# Patient Record
Sex: MALE | Race: WHITE | NOT HISPANIC OR LATINO | Employment: FULL TIME | URBAN - METROPOLITAN AREA
[De-identification: names, ages, dates, MRNs, and addresses within clinical notes are randomized per-mention and may not be internally consistent; named-entity substitution may affect disease eponyms.]

---

## 2021-09-30 ENCOUNTER — THERAPY VISIT (OUTPATIENT)
Dept: PHYSICAL THERAPY | Facility: CLINIC | Age: 61
End: 2021-09-30
Payer: COMMERCIAL

## 2021-09-30 DIAGNOSIS — G89.29 CHRONIC PAIN OF LEFT KNEE: ICD-10-CM

## 2021-09-30 DIAGNOSIS — M25.562 CHRONIC PAIN OF LEFT KNEE: ICD-10-CM

## 2021-09-30 PROCEDURE — 97161 PT EVAL LOW COMPLEX 20 MIN: CPT | Mod: GP | Performed by: PHYSICAL THERAPIST

## 2021-09-30 PROCEDURE — 97110 THERAPEUTIC EXERCISES: CPT | Mod: GP | Performed by: PHYSICAL THERAPIST

## 2021-09-30 ASSESSMENT — ACTIVITIES OF DAILY LIVING (ADL)
KNEE_ACTIVITY_OF_DAILY_LIVING_SUM: 59
SQUAT: ACTIVITY IS VERY DIFFICULT
WEAKNESS: I DO NOT HAVE THE SYMPTOM
RISE FROM A CHAIR: ACTIVITY IS MINIMALLY DIFFICULT
LIMPING: I HAVE THE SYMPTOM BUT IT DOES NOT AFFECT MY ACTIVITY
RAW_SCORE: 59
HOW_WOULD_YOU_RATE_THE_OVERALL_FUNCTION_OF_YOUR_KNEE_DURING_YOUR_USUAL_DAILY_ACTIVITIES?: NEARLY NORMAL
HOW_WOULD_YOU_RATE_THE_CURRENT_FUNCTION_OF_YOUR_KNEE_DURING_YOUR_USUAL_DAILY_ACTIVITIES_ON_A_SCALE_FROM_0_TO_100_WITH_100_BEING_YOUR_LEVEL_OF_KNEE_FUNCTION_PRIOR_TO_YOUR_INJURY_AND_0_BEING_THE_INABILITY_TO_PERFORM_ANY_OF_YOUR_USUAL_DAILY_ACTIVITIES?: 70
SIT WITH YOUR KNEE BENT: ACTIVITY IS NOT DIFFICULT
WALK: ACTIVITY IS NOT DIFFICULT
PAIN: I HAVE THE SYMPTOM BUT IT DOES NOT AFFECT MY ACTIVITY
KNEEL ON THE FRONT OF YOUR KNEE: ACTIVITY IS SOMEWHAT DIFFICULT
GO UP STAIRS: ACTIVITY IS MINIMALLY DIFFICULT
AS_A_RESULT_OF_YOUR_KNEE_INJURY,_HOW_WOULD_YOU_RATE_YOUR_CURRENT_LEVEL_OF_DAILY_ACTIVITY?: NORMAL
GIVING WAY, BUCKLING OR SHIFTING OF KNEE: I DO NOT HAVE THE SYMPTOM
STIFFNESS: I HAVE THE SYMPTOM BUT IT DOES NOT AFFECT MY ACTIVITY
STAND: ACTIVITY IS NOT DIFFICULT
KNEE_ACTIVITY_OF_DAILY_LIVING_SCORE: 84.29
SWELLING: I DO NOT HAVE THE SYMPTOM
GO DOWN STAIRS: ACTIVITY IS NOT DIFFICULT

## 2021-09-30 NOTE — PROGRESS NOTES
[facility]                  PHYSICAL THERAPY INITIAL EVALUATION    Precautions/Restrictions/MD instructions:  Evaluate & Treat - Left Knee Pain    Therapist Assessment: Ronald Le is a 60 year old male who presents to Physical Therapy with chronic left knee pain.  Patient demonstrates bilateral genu varus deformity with knee flexion contractures, proximal tib-fib hypomobility, decreased knee extension at heel strike, decreased LE flexibility, and left quad atrophy.  Signs and symptoms are consistent with chronic left knee pain due to OA and mobility loss.   These impairments limit his ability to squat/kneel.  Skilled PT services are necessary in order to reduce impairments and improve independent function.     SUBJECTIVE:  Chief Complaint: left knee and lower leg pain  Onset Date:  ~ 10 years ago   MD referral date:  9/22/21   DOS: NA  Mechanism of Injury: unknown; first noticed pain while running  New/Recurrent/Chronic:  chronic  Pain Location: left posterolateral knee and lateral lower leg;  described as intermittent ache and rated as 2/10  Associated Symptoms: motion loss; denies catching/locking, numbness/tingling  Exacerbated by:  Kneeling, bending knee              Alleviated by: rest  Time of day: no effect  Progression of symptoms since onset:  Gradually worsening  Previous Treatment:  None    General Health as reported by patient: Excellent  Pertinent Medical History: none  Surgical History: none  Imaging:  X-Rays (OA)  Medications:  Atorvastatin    Occupation:      Primary Job Tasks: prolonged standing/walking on concrete    Restrictions:  Working in normal job without restrictions  Barriers to Treatment:  none  Red Flags:  None  Current Functional Status: impaired  Previous Functional Status:  fully functional  Leisure Activities:  none    Patient's Goal(s):  To find out what's wrong with his knee; increase ROM              Objective:  Standing Alignment:    Cervical/Thoracic:  Thoracic  kyphosis increased  Shoulder/UE:  Rounded shoulders        Knee:  Genu varus R and genu varus L (bilateral knee flexion contractures)      Gait:      Deviations:  Knee:  Knee extension decr R and knee extension decr L    Flexibility/Screens:       Lower Extremity:  Decreased left lower extremity flexibility:Hip Flexors; IT Band; Quadriceps; Hamstrings; Gastroc and Soleus    Decreased right lower extremity flexibility:  Hip Flexors; IT Band; Quadriceps; Gastroc and Soleus                                                      Knee Evaluation:  ROM:    AROM    Hyperextension:  Left:  0    Right: 0  Extension:  Left: 15    Right:  12  Flexion: Left: 116    Right: 133  PROM    Hyperextension: Left: 0    Right:  0  Extension: Left: 10    Right:  8  Flexion: Left: 118    Right:  137      Strength:     Extension:  Left: 5/5    Pain:-      Right: 5/5    Pain:-  Flexion:  Left: 5/5    Pain:-      Right: 5/5    Pain:-    Quad Set Left: Fair and delayed    Pain:   Quad Set Right: Fair    Pain:    Special Tests:     Left knee negative for the following special tests:  Patellar compression    Palpation:    Left knee tenderness present at:  Lateral Joint Line  Left knee tenderness not present at:  Medial Joint Line; Patellar Tendon and IT Band    Edema:  Normal    Mobility Testing:    Proximal Tib-Fib:  Left: hypomobile    Right: hypomobile    Patellofemoral Lateral:  Left: normal    Right: normal  Patellofemoral Superior:  Left: normal    Right: normal  Patellofemoral Inferior:  Left: normal    Right: normal  Functional Testing:          Quad:    Single Leg Squat:  Left:       Significant loss of control and hip substitution  Right:       Moderate loss of control and hip substitution  Bilateral Leg Squat:   Normal control              General  quad atrophy left    ROS    Assessment/Plan:    Patient is a 60 year old male with left knee complaints.    Patient has the following significant findings with corresponding treatment plan.                 Diagnosis 1:  Left Knee Pain   Pain -  self management, education, directional preference exercise and home program  Decreased ROM/flexibility - manual therapy and therapeutic exercise  Decreased joint mobility - manual therapy and therapeutic exercise  Decreased strength - therapeutic exercise and therapeutic activities  Decreased function - home program  Impaired posture - neuro re-education    Cumulative Therapy Evaluation is: Low complexity.    Previous and current functional limitations:  (See Goal Flow Sheet for this information)    Short term and Long term goals: (See Goal Flow Sheet for this information)     Communication ability:  Patient appears to be able to clearly communicate and understand verbal and written communication and follow directions correctly.  Treatment Explanation - The following has been discussed with the patient:    RX ordered/plan of care  Anticipated outcomes  Possible risks and side effects  This patient would benefit from PT intervention to resume normal activities.   Rehab potential is good.    Frequency:  1 X week, once daily  Duration:  for 6 weeks  Discharge Plan:  Achieve all LTG.  Independent in home treatment program.  Reach maximal therapeutic benefit.    Please refer to the daily flowsheet for treatment today, total treatment time and time spent performing 1:1 timed codes.

## 2021-09-30 NOTE — LETTER
[facility]  [address]  [address]  [place] [address]  [phone]    2021    Re: Tommy Le   :   1960  MRN:  [id number]   REFERRING PHYSICIAN:   Anuradha TRAORE [facility] [place]    Date of Initial Evaluation:  2021  Visits:  Rxs Used: 1  Reason for Referral:  Chronic pain of left knee    EVALUATION SUMMARY    HPI                  PHYSICAL THERAPY INITIAL EVALUATION    Precautions/Restrictions/MD instructions:  Evaluate & Treat - Left Knee Pain    Therapist Assessment: Ronald Le is a 60 year old male who presents to Physical Therapy with chronic left knee pain.  Patient demonstrates bilateral genu varus deformity with knee flexion contractures, proximal tib-fib hypomobility, decreased knee extension at heel strike, decreased LE flexibility, and left quad atrophy.  Signs and symptoms are consistent with chronic left knee pain due to OA and mobility loss.   These impairments limit his ability to squat/kneel.  Skilled PT services are necessary in order to reduce impairments and improve independent function.     SUBJECTIVE:  Chief Complaint: left knee and lower leg pain  Onset Date:  ~ 10 years ago   MD referral date:  21   DOS: NA  Mechanism of Injury: unknown; first noticed pain while running  New/Recurrent/Chronic:  chronic  Pain Location: left posterolateral knee and lateral lower leg;  described as intermittent ache and rated as 2/10  Associated Symptoms: motion loss; denies catching/locking, numbness/tingling  Exacerbated by:  Kneeling, bending knee              Alleviated by: rest  Time of day: no effect  Progression of symptoms since onset:  Gradually worsening  Previous Treatment:  None  General Health as reported by patient: Excellent  Pertinent Medical History: none  Surgical History: none  Imaging:  X-Rays (OA)  Medications:  Atorvastatin  Occupation:  Inventory  Controller    Primary Job Tasks: prolonged standing/walking on concrete    Re: Tommy Le   :   1960    Restrictions:  Working in normal job without restrictions  Barriers to Treatment:  none  Red Flags:  None  Current Functional Status: impaired  Previous Functional Status:  fully functional  Leisure Activities:  none  Patient's Goal(s):  To find out what's wrong with his knee; increase ROM    Objective:  Standing Alignment:    Cervical/Thoracic:  Thoracic kyphosis increased  Shoulder/UE:  Rounded shoulders  Knee:  Genu varus R and genu varus L (bilateral knee flexion contractures)    Gait:    Deviations:  Knee:  Knee extension decr R and knee extension decr L  Flexibility/Screens:   Lower Extremity:  Decreased left lower extremity flexibility:Hip Flexors; IT Band; Quadriceps; Hamstrings; Gastroc and Soleus  Decreased right lower extremity flexibility:  Hip Flexors; IT Band; Quadriceps; Gastroc and Soleus    Knee Evaluation:  ROM:    AROM    Hyperextension:  Left:  0    Right: 0  Extension:  Left: 15    Right:  12  Flexion: Left: 116    Right: 133  PROM    Hyperextension: Left: 0    Right:  0  Extension: Left: 10    Right:  8  Flexion: Left: 118    Right:  137    Strength:   Extension:  Left: 5/5    Pain:-      Right: 5/5    Pain:-  Flexion:  Left: 5/5    Pain:-      Right: 5/5    Pain:-    Quad Set Left: Fair and delayed    Pain:   Quad Set Right: Fair    Pain:    Special Tests:   Left knee negative for the following special tests:  Patellar compression    Palpation:    Left knee tenderness present at:  Lateral Joint Line  Left knee tenderness not present at:  Medial Joint Line; Patellar Tendon and IT Band    Edema:  Normal    Re: Tommy Le   :   1960    Mobility Testing:    Proximal Tib-Fib:  Left: hypomobile    Right: hypomobile  Patellofemoral Lateral:  Left: normal    Right: normal  Patellofemoral Superior:  Left: normal    Right: normal  Patellofemoral Inferior:  Left:  normal    Right: normal  Functional Testing:      Quad:    Single Leg Squat:  Left:       Significant loss of control and hip substitution  Right:       Moderate loss of control and hip substitution  Bilateral Leg Squat:   Normal control      General  quad atrophy left  ROS    Assessment/Plan:    Patient is a 60 year old male with left knee complaints.    Patient has the following significant findings with corresponding treatment plan.                Diagnosis 1:  Left Knee Pain   Pain -  self management, education, directional preference exercise and home program  Decreased ROM/flexibility - manual therapy and therapeutic exercise  Decreased joint mobility - manual therapy and therapeutic exercise  Decreased strength - therapeutic exercise and therapeutic activities  Decreased function - home program  Impaired posture - neuro re-education    Cumulative Therapy Evaluation is: Low complexity.    Previous and current functional limitations:  (See Goal Flow Sheet for this information)    Short term and Long term goals: (See Goal Flow Sheet for this information)     Communication ability:  Patient appears to be able to clearly communicate and understand verbal and written communication and follow directions correctly.  Treatment Explanation - The following has been discussed with the patient:    RX ordered/plan of care  Anticipated outcomes  Possible risks and side effects  This patient would benefit from PT intervention to resume normal activities.   Rehab potential is good.                        Re: Tommy Le   :   1960    Frequency:  1 X week, once daily  Duration:  for 6 weeks  Discharge Plan:  Achieve all LTG.  Independent in home treatment program.  Reach maximal therapeutic benefit.    Thank you for your referral.    INQUIRIES  Therapist: Cassidy Hubbard PT  [address] [address]  Phone: [phone]  Fax:  [phone]

## 2021-10-05 ENCOUNTER — THERAPY VISIT (OUTPATIENT)
Dept: PHYSICAL THERAPY | Facility: CLINIC | Age: 61
End: 2021-10-05
Payer: COMMERCIAL

## 2021-10-05 DIAGNOSIS — G89.29 CHRONIC PAIN OF LEFT KNEE: ICD-10-CM

## 2021-10-05 DIAGNOSIS — M25.562 CHRONIC PAIN OF LEFT KNEE: ICD-10-CM

## 2021-10-05 PROCEDURE — 97140 MANUAL THERAPY 1/> REGIONS: CPT | Mod: GP | Performed by: PHYSICAL THERAPIST

## 2021-10-05 PROCEDURE — 97110 THERAPEUTIC EXERCISES: CPT | Mod: GP | Performed by: PHYSICAL THERAPIST

## 2021-10-18 ENCOUNTER — THERAPY VISIT (OUTPATIENT)
Dept: PHYSICAL THERAPY | Facility: CLINIC | Age: 61
End: 2021-10-18
Payer: COMMERCIAL

## 2021-10-18 DIAGNOSIS — G89.29 CHRONIC PAIN OF LEFT KNEE: ICD-10-CM

## 2021-10-18 DIAGNOSIS — M25.562 CHRONIC PAIN OF LEFT KNEE: ICD-10-CM

## 2021-10-18 PROCEDURE — 97110 THERAPEUTIC EXERCISES: CPT | Mod: GP | Performed by: PHYSICAL THERAPIST

## 2021-10-18 PROCEDURE — 97112 NEUROMUSCULAR REEDUCATION: CPT | Mod: GP | Performed by: PHYSICAL THERAPIST

## 2021-11-02 ENCOUNTER — THERAPY VISIT (OUTPATIENT)
Dept: PHYSICAL THERAPY | Facility: CLINIC | Age: 61
End: 2021-11-02
Payer: COMMERCIAL

## 2021-11-02 DIAGNOSIS — G89.29 CHRONIC PAIN OF LEFT KNEE: ICD-10-CM

## 2021-11-02 DIAGNOSIS — M25.562 CHRONIC PAIN OF LEFT KNEE: ICD-10-CM

## 2021-11-02 PROCEDURE — 97110 THERAPEUTIC EXERCISES: CPT | Mod: GP | Performed by: PHYSICAL THERAPIST

## 2021-11-02 PROCEDURE — 97140 MANUAL THERAPY 1/> REGIONS: CPT | Mod: GP | Performed by: PHYSICAL THERAPIST

## 2021-11-09 ENCOUNTER — THERAPY VISIT (OUTPATIENT)
Dept: PHYSICAL THERAPY | Facility: CLINIC | Age: 61
End: 2021-11-09
Payer: COMMERCIAL

## 2021-11-09 DIAGNOSIS — M25.562 CHRONIC PAIN OF LEFT KNEE: ICD-10-CM

## 2021-11-09 DIAGNOSIS — G89.29 CHRONIC PAIN OF LEFT KNEE: ICD-10-CM

## 2021-11-09 PROCEDURE — 97110 THERAPEUTIC EXERCISES: CPT | Mod: GP | Performed by: PHYSICAL THERAPIST

## 2021-11-09 PROCEDURE — 97530 THERAPEUTIC ACTIVITIES: CPT | Mod: GP | Performed by: PHYSICAL THERAPIST

## 2021-11-09 PROCEDURE — 97140 MANUAL THERAPY 1/> REGIONS: CPT | Mod: GP | Performed by: PHYSICAL THERAPIST

## 2021-11-29 ENCOUNTER — THERAPY VISIT (OUTPATIENT)
Dept: PHYSICAL THERAPY | Facility: CLINIC | Age: 61
End: 2021-11-29
Payer: COMMERCIAL

## 2021-11-29 DIAGNOSIS — G89.29 CHRONIC PAIN OF LEFT KNEE: ICD-10-CM

## 2021-11-29 DIAGNOSIS — M25.562 CHRONIC PAIN OF LEFT KNEE: ICD-10-CM

## 2021-11-29 PROCEDURE — 97140 MANUAL THERAPY 1/> REGIONS: CPT | Mod: GP | Performed by: PHYSICAL THERAPIST

## 2021-11-29 PROCEDURE — 97110 THERAPEUTIC EXERCISES: CPT | Mod: GP | Performed by: PHYSICAL THERAPIST

## 2021-11-29 ASSESSMENT — ACTIVITIES OF DAILY LIVING (ADL)
GO UP STAIRS: ACTIVITY IS MINIMALLY DIFFICULT
RISE FROM A CHAIR: ACTIVITY IS MINIMALLY DIFFICULT
STAND: ACTIVITY IS NOT DIFFICULT
WEAKNESS: I DO NOT HAVE THE SYMPTOM
STIFFNESS: THE SYMPTOM AFFECTS MY ACTIVITY MODERATELY
SQUAT: ACTIVITY IS VERY DIFFICULT
SIT WITH YOUR KNEE BENT: ACTIVITY IS NOT DIFFICULT
PAIN: THE SYMPTOM AFFECTS MY ACTIVITY SLIGHTLY
GIVING WAY, BUCKLING OR SHIFTING OF KNEE: I DO NOT HAVE THE SYMPTOM
SWELLING: I DO NOT HAVE THE SYMPTOM
WALK: ACTIVITY IS NOT DIFFICULT
LIMPING: I HAVE THE SYMPTOM BUT IT DOES NOT AFFECT MY ACTIVITY
RAW_SCORE: 58
KNEE_ACTIVITY_OF_DAILY_LIVING_SCORE: 82.86
GO DOWN STAIRS: ACTIVITY IS NOT DIFFICULT
KNEEL ON THE FRONT OF YOUR KNEE: ACTIVITY IS NOT DIFFICULT
KNEE_ACTIVITY_OF_DAILY_LIVING_SUM: 58

## 2021-11-29 NOTE — LETTER
[facility] [place]  [address]  [address]  [place] [address]  [phone]    2021    Re: Tommy Le   :   1960  MRN:  [id number]   REFERRING PHYSICIAN:   Anuradha TRAORE [facility] [place]    Date of Initial Evaluation:  2021  Visits:  Rxs Used: 6  Reason for Referral:  Chronic pain of left knee    EVALUATION SUMMARY    Subjective:  HPI  Physical Exam                    Objective:  System    Physical Exam    General     ROS    Assessment/Plan:    DISCHARGE REPORT    Progress reporting period is from 21 to 21.       SUBJECTIVE  Patient reports moderate improvement in his left knee since the start of therapy. He feels like he can straighten his knee better, but has not really noticed much change in being able to bend his knee.  Getting up from a kneeling position on the floor is especially difficult.  Recently went hunting and walked a lot out in the woods so did not do exercises for about a week.      Current pain level is 0/10  .     Previous pain level was  2/10    Changes in function:  None  Adverse reaction to treatment or activity: None      Re: Tommy Le   :   1960    OBJECTIVE  AROM L Knee: 0-.  PROM L Knee: 0-8-110 with hard end feel  (NOTE: flexion ROM reflects a decline in mobility from one month ago)  Gait: ambulates independently with decreased knee extension at heel strike bilat.  Flexibility has improved in HS and gastroc  Swelling: none  Strength: quads 5/5, HS 5/5, hip flexors 5/5, hip abductors 5/5  Functional Testing: able to perform bilateral squat with symmetrical knee flexion and control to ~ 90 deg; demonstrates significant loss of control with single leg squat on left; moderate loss of control with single leg squat on right.  Palpation: appears to have significant medial joint space narrowing in left knee, but without tenderness         ASSESSMENT/PLAN  Updated problem list and treatment plan: Diagnosis 1:  Left Knee OA   Pain -  self management, education and home program  Decreased ROM/flexibility - manual therapy and therapeutic exercise  Decreased strength - therapeutic exercise and therapeutic activities  Impaired gait - home program  Decreased function - home program    STG/LTGs have been met or progress has been made towards goals:  no  Assessment of Progress: The patient's progress has plateaued.  Self Management Plans:  Patient has been instructed in a home exercise program emphasizing ROM and flexibility; to a lesser degree strength.  I have re-evaluated this patient and find that the nature, scope, duration and intensity of the therapy is appropriate for the medical condition of the patient.  Tommy continues to require the following intervention to meet STG and LTG's:  MD    Recommendations:  Recommend orthopedic consult to address ongoing motion restrictions and future POC.     Thank you for your referral.    INQUIRIES  Therapist: Cassidy Hubbard PT   [facility] [address]  [facility] [address]  Phone: [phone]  Fax: [phone]

## 2021-11-29 NOTE — PROGRESS NOTES
Subjective:  HPI  Physical Exam                    Objective:  System    Physical Exam    General     ROS    Assessment/Plan:    DISCHARGE REPORT    Progress reporting period is from 9/30/21 to 11/29/21.       SUBJECTIVE  Patient reports moderate improvement in his left knee since the start of therapy. He feels like he can straighten his knee better, but has not really noticed much change in being able to bend his knee.  Getting up from a kneeling position on the floor is especially difficult.  Recently went hunting and walked a lot out in the woods so did not do exercises for about a week.      Current pain level is 0/10  .     Previous pain level was  2/10    Changes in function:  None  Adverse reaction to treatment or activity: None    OBJECTIVE  AROM L Knee: 0-.  PROM L Knee: 0-8-110 with hard end feel  (NOTE: flexion ROM reflects a decline in mobility from one month ago)  Gait: ambulates independently with decreased knee extension at heel strike bilat.  Flexibility has improved in HS and gastroc  Swelling: none  Strength: quads 5/5, HS 5/5, hip flexors 5/5, hip abductors 5/5  Functional Testing: able to perform bilateral squat with symmetrical knee flexion and control to ~ 90 deg; demonstrates significant loss of control with single leg squat on left; moderate loss of control with single leg squat on right.  Palpation: appears to have significant medial joint space narrowing in left knee, but without tenderness        ASSESSMENT/PLAN  Updated problem list and treatment plan: Diagnosis 1:  Left Knee OA   Pain -  self management, education and home program  Decreased ROM/flexibility - manual therapy and therapeutic exercise  Decreased strength - therapeutic exercise and therapeutic activities  Impaired gait - home program  Decreased function - home program    STG/LTGs have been met or progress has been made towards goals:  no  Assessment of Progress: The patient's progress has plateaued.  Self Management  Plans:  Patient has been instructed in a home exercise program emphasizing ROM and flexibility; to a lesser degree strength.  I have re-evaluated this patient and find that the nature, scope, duration and intensity of the therapy is appropriate for the medical condition of the patient.  Tommy continues to require the following intervention to meet STG and LTG's:  MD    Recommendations:  Recommend orthopedic consult to address ongoing motion restrictions and future POC.     Please refer to the daily flowsheet for treatment today, total treatment time and time spent performing 1:1 timed codes.

## 2022-02-07 PROBLEM — M25.562 CHRONIC PAIN OF LEFT KNEE: Status: RESOLVED | Noted: 2021-09-30 | Resolved: 2022-02-07

## 2022-02-07 PROBLEM — G89.29 CHRONIC PAIN OF LEFT KNEE: Status: RESOLVED | Noted: 2021-09-30 | Resolved: 2022-02-07
